# Patient Record
Sex: FEMALE | Race: WHITE | NOT HISPANIC OR LATINO | Employment: OTHER | URBAN - METROPOLITAN AREA
[De-identification: names, ages, dates, MRNs, and addresses within clinical notes are randomized per-mention and may not be internally consistent; named-entity substitution may affect disease eponyms.]

---

## 2021-01-17 ENCOUNTER — HOSPITAL ENCOUNTER (EMERGENCY)
Facility: CLINIC | Age: 46
Discharge: HOME | End: 2021-01-17
Attending: EMERGENCY MEDICINE | Admitting: EMERGENCY MEDICINE
Payer: COMMERCIAL

## 2021-01-17 ENCOUNTER — APPOINTMENT (OUTPATIENT)
Dept: MRI IMAGING | Facility: CLINIC | Age: 46
End: 2021-01-17
Attending: EMERGENCY MEDICINE
Payer: COMMERCIAL

## 2021-01-17 VITALS
SYSTOLIC BLOOD PRESSURE: 149 MMHG | TEMPERATURE: 98.8 F | OXYGEN SATURATION: 98 % | RESPIRATION RATE: 18 BRPM | DIASTOLIC BLOOD PRESSURE: 103 MMHG | HEART RATE: 96 BPM

## 2021-01-17 DIAGNOSIS — R42 VERTIGO: ICD-10-CM

## 2021-01-17 DIAGNOSIS — U07.1 2019 NOVEL CORONAVIRUS DISEASE (COVID-19): ICD-10-CM

## 2021-01-17 LAB
ANION GAP SERPL CALCULATED.3IONS-SCNC: 4 MMOL/L (ref 3–14)
BASOPHILS # BLD AUTO: 0 10E9/L (ref 0–0.2)
BASOPHILS NFR BLD AUTO: 0.4 %
BUN SERPL-MCNC: 12 MG/DL (ref 7–30)
CALCIUM SERPL-MCNC: 9.1 MG/DL (ref 8.5–10.1)
CHLORIDE SERPL-SCNC: 107 MMOL/L (ref 94–109)
CO2 SERPL-SCNC: 27 MMOL/L (ref 20–32)
CREAT SERPL-MCNC: 0.7 MG/DL (ref 0.52–1.04)
DIFFERENTIAL METHOD BLD: NORMAL
EOSINOPHIL # BLD AUTO: 0.1 10E9/L (ref 0–0.7)
EOSINOPHIL NFR BLD AUTO: 1.8 %
ERYTHROCYTE [DISTWIDTH] IN BLOOD BY AUTOMATED COUNT: 12.2 % (ref 10–15)
GFR SERPL CREATININE-BSD FRML MDRD: >90 ML/MIN/{1.73_M2}
GLUCOSE SERPL-MCNC: 81 MG/DL (ref 70–99)
HCT VFR BLD AUTO: 42.7 % (ref 35–47)
HGB BLD-MCNC: 14 G/DL (ref 11.7–15.7)
IMM GRANULOCYTES # BLD: 0 10E9/L (ref 0–0.4)
IMM GRANULOCYTES NFR BLD: 0.3 %
LYMPHOCYTES # BLD AUTO: 2.7 10E9/L (ref 0.8–5.3)
LYMPHOCYTES NFR BLD AUTO: 34.1 %
MCH RBC QN AUTO: 29.4 PG (ref 26.5–33)
MCHC RBC AUTO-ENTMCNC: 32.8 G/DL (ref 31.5–36.5)
MCV RBC AUTO: 90 FL (ref 78–100)
MONOCYTES # BLD AUTO: 0.6 10E9/L (ref 0–1.3)
MONOCYTES NFR BLD AUTO: 8.2 %
NEUTROPHILS # BLD AUTO: 4.3 10E9/L (ref 1.6–8.3)
NEUTROPHILS NFR BLD AUTO: 55.2 %
NRBC # BLD AUTO: 0 10*3/UL
NRBC BLD AUTO-RTO: 0 /100
PLATELET # BLD AUTO: 278 10E9/L (ref 150–450)
POTASSIUM SERPL-SCNC: 3.6 MMOL/L (ref 3.4–5.3)
RBC # BLD AUTO: 4.77 10E12/L (ref 3.8–5.2)
SODIUM SERPL-SCNC: 138 MMOL/L (ref 133–144)
TROPONIN I SERPL-MCNC: <0.015 UG/L (ref 0–0.04)
WBC # BLD AUTO: 7.8 10E9/L (ref 4–11)

## 2021-01-17 PROCEDURE — 93005 ELECTROCARDIOGRAM TRACING: CPT

## 2021-01-17 PROCEDURE — 85025 COMPLETE CBC W/AUTO DIFF WBC: CPT | Performed by: EMERGENCY MEDICINE

## 2021-01-17 PROCEDURE — 250N000013 HC RX MED GY IP 250 OP 250 PS 637: Performed by: EMERGENCY MEDICINE

## 2021-01-17 PROCEDURE — 80048 BASIC METABOLIC PNL TOTAL CA: CPT | Performed by: EMERGENCY MEDICINE

## 2021-01-17 PROCEDURE — 258N000003 HC RX IP 258 OP 636: Performed by: EMERGENCY MEDICINE

## 2021-01-17 PROCEDURE — 96360 HYDRATION IV INFUSION INIT: CPT | Mod: 59

## 2021-01-17 PROCEDURE — A9585 GADOBUTROL INJECTION: HCPCS | Performed by: EMERGENCY MEDICINE

## 2021-01-17 PROCEDURE — 84484 ASSAY OF TROPONIN QUANT: CPT | Performed by: EMERGENCY MEDICINE

## 2021-01-17 PROCEDURE — 70553 MRI BRAIN STEM W/O & W/DYE: CPT

## 2021-01-17 PROCEDURE — 255N000002 HC RX 255 OP 636: Performed by: EMERGENCY MEDICINE

## 2021-01-17 PROCEDURE — 99285 EMERGENCY DEPT VISIT HI MDM: CPT | Mod: 25

## 2021-01-17 RX ORDER — DIAZEPAM 5 MG
5 TABLET ORAL EVERY 6 HOURS PRN
Qty: 10 TABLET | Refills: 0 | Status: SHIPPED | OUTPATIENT
Start: 2021-01-17

## 2021-01-17 RX ORDER — DIAZEPAM 5 MG
5 TABLET ORAL ONCE
Status: COMPLETED | OUTPATIENT
Start: 2021-01-17 | End: 2021-01-17

## 2021-01-17 RX ORDER — GADOBUTROL 604.72 MG/ML
10 INJECTION INTRAVENOUS ONCE
Status: COMPLETED | OUTPATIENT
Start: 2021-01-17 | End: 2021-01-17

## 2021-01-17 RX ADMIN — SODIUM CHLORIDE 1000 ML: 9 INJECTION, SOLUTION INTRAVENOUS at 19:09

## 2021-01-17 RX ADMIN — DIAZEPAM 5 MG: 5 TABLET ORAL at 19:01

## 2021-01-17 RX ADMIN — GADOBUTROL 10 ML: 604.72 INJECTION INTRAVENOUS at 20:10

## 2021-01-17 ASSESSMENT — ENCOUNTER SYMPTOMS
WEAKNESS: 0
DIZZINESS: 1
SPEECH DIFFICULTY: 0
COUGH: 0
VOMITING: 0
FACIAL ASYMMETRY: 0

## 2021-01-17 NOTE — ED TRIAGE NOTES
"Patient arrives c/o vertigo for the last couple days with associated numbness/tingling/\"buzzing\" \"everywhere\". Pt is on day 10 of COVID and reports that some symptoms have improved but the dizziness, vertigo, and blurred vision that has been present for the last couple days is concerning. ABCs intact.   "

## 2021-01-18 LAB — INTERPRETATION ECG - MUSE: NORMAL

## 2021-01-18 NOTE — ED PROVIDER NOTES
"  History     Chief Complaint:  Dizziness     The history is provided by the patient.     Citlaly Burton is a healthy 45 year old female who presents for evaluation of dizziness. Citlaly tested positive for COVID-19 10 days ago with an at home COVID test. Her symptoms included headache, congestion, and loss of taste and smell. She had chest pain a few days ago and called her primary who prescribed her Zithromax. Her chest pain has since resolved. She did not have cough or dyspnea. However, over the last couple of days she has had numbness and tingling over her whole body which is described as \"buzzing\" like pins and needles. She feels dizzy as if the room is spinning and has a hard time seeing straight due to her blurry vision and \"crossed eyes\" without diplopia. She also has bilateral intermittent tinnitus. She was seen at Urgent Care for these symptoms and was directed here for further evaluation. She has been using Dramamine for dizziness without improvement, last dose 5 hours prior to interview. She denies fever, emesis, facial asymmetry, weakness, gait change, or speech difficulty.    Review of Systems   Constitutional: Negative for fever.   HENT: Positive for congestion and tinnitus (intermittent bilateral). Negative for ear pain.         Loss of taste and smell   Eyes: Positive for visual disturbance.        No diplopia   Respiratory: Negative for cough and shortness of breath.    Cardiovascular: Negative for chest pain (resolved).   Gastrointestinal: Negative for vomiting.   Musculoskeletal: Negative for gait problem.   Neurological: Positive for dizziness, numbness (entire body \"buzzing\") and headaches. Negative for facial asymmetry, speech difficulty and weakness.   All other systems reviewed and are negative.    Allergies:  No Known Drug Allergies    Medications:   Dramamine     Medical History:   She denies any past medical history.      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  Smoking status: negative   She presented alone today.     Physical Exam     Patient Vitals for the past 24 hrs:   BP Temp Temp src Pulse Resp SpO2   01/17/21 2035 (!) 149/103 -- -- -- -- --   01/17/21 1739 (!) 157/110 98.8  F (37.1  C) Oral 96 18 98 %      Physical Exam  General: Well-developed and well-nourished. Well appearing middle aged  woman. Cooperative.  Head:  Atraumatic.  Eyes:  Conjunctivae, lids, and sclerae are normal.  ENT:    Normal nose. Moist mucous membranes.  Neck:  Supple. Normal range of motion.  CV:  Regular rate and rhythm. Normal heart sounds with no murmurs, rubs, or gallops detected.  Resp:  No respiratory distress. Clear to auscultation bilaterally without decreased breath sounds, wheezing, rales, or rhonchi.  GI:  Soft. Non-distended. Non-tender.    MS:  Normal ROM. No bilateral lower extremity edema.  Skin:  Warm. Non-diaphoretic. No pallor.  Neuro: Awake. A&Ox3.     Strength 5/5 bilateral upper and lower extremities.    No pronator drift.    Sensation intact to light touch.    No facial droop. No dysarthria.    No aphasia.    PERRL.     No visual field deficits.    No dysmetria.    No dysdiadochokinesis.  Psych:  Normal mood and affect. Normal speech.  Vitals reviewed.    Emergency Department Course   EKG  Indication: Chest pain  Time: 1920  Rate 81 bpm. WI interval 192. QRS duration 100. QT/QTc 410/476.   Normal sinus rhythm  Normal ECG  No acute ST changes.  No prior EKG for comparison.    Imaging:  MR Brain w/o & w Contrast  1.  No evidence for acute infarction.    2.  No mass or mass effect.    3.  Normal ventricular caliber.    4.  No intracranial acute abnormalities identified.    5.  Mild membrane thickening is evident in the paranasal sinuses/ethmoid air cells.    6.  No abnormal enhancement.    7.  No evidence for intracranial hemorrhage/blood products.  Reading per radiology     Laboratory:  CBC: WBC 7.8, HGB 14.0,   BMP:  WNL (Creatinine  "0.70)  Troponin (Collected 1908): <0.015     Emergency Department Course:  Reviewed:  1828 I reviewed nursing notes, vitals, and past medical history.    Assessments:  2035 I updated and rechecked Citlaly. She passed a road test and is comfortable with discharge. Her dizziness is improved.    Interventions:  1901 Valium 5 mg PO   1909 0.9% NS 1000 mL IV    Disposition:  The patient was discharged home.   Impression & Plan   Covid-19  Citlaly Burton was evaluated during a global COVID-19 pandemic, which necessitated consideration that the patient might be at risk for infection with the SARS-CoV-2 virus that causes COVID-19.   Applicable protocols for evaluation were followed during the patient's care.   COVID-19 was considered as part of the patient's evaluation. The plan for testing is:  a test was obtained at a previously and reviewed & considered today.    Medical Decision Making:  Citlaly is a 45-year-old woman who has had 10 days of COVID-19 symptoms including loss of taste and smell and positive home COVID-19 test.  She tells me she has not had fever, shortness of breath, or cough but rather \"neurologic symptoms\".  She describes \"buzzing\" all over her body as well as vertigo.  She describes \"crossed eyes\" which she attempted to show me and I was unable to visualize but I believe she means blurred vision.  She was sent here from urgent care as they were concerned for CVA given new dizziness in the setting of COVID-19.  Her symptoms have not been improved with home Dramamine.  Fortunately, she appears quite well on exam and has no focal neurologic deficits.  To practice with an abundance of caution given known propensity for thrombosis in COVID-19, I did send her for MRI of the brain.  Fortunately, this reveals no infarct or other acute pathology aside from mild paranasal sinus membrane thickening which is likely related to her COVID-19.  EKG is reassuring without acute ST changes or arrhythmias and troponin is " undetectable.  I highly doubt a cardiac etiology for her dizziness.  Her basic laboratory studies are noncontributory.  She was given IV fluids and diazepam and did have improvement in her vertigo.  She was able to tolerate a trial of ambulation and is amenable with plan for discharge.  I recommended she rest and drink lots of fluids with Tylenol or ibuprofen should she develop fever.  I did provide her a small number of Valium as needed for persistent vertigo though she understands the need for close follow-up as well as the return precautions we discussed.  All of her questions were answered and she verbalized understanding.  Amenable to discharge.    Diagnosis:     ICD-10-CM    1. 2019 novel coronavirus disease (COVID-19)  U07.1    2. Vertigo  R42         Discharge Medications:  New Prescriptions    DIAZEPAM (VALIUM) 5 MG TABLET    Take 1 tablet (5 mg) by mouth every 6 hours as needed (Vertigo)       Scribe Disclosure:  I, Promise Munguia, am serving as a scribe at 6:28 PM on 1/17/2021 to document services personally performed by Rae Watts based on my observations and the provider's statements to me.     [facility] Rae Junior MD  01/19/21 [id number]

## 2021-01-18 NOTE — DISCHARGE INSTRUCTIONS
Make sure you are resting and drinking plenty of fluids.  Use Tylenol or ibuprofen as needed for pain or fever.  Valium as needed for vertigo.  This will make you drowsy do not drive afterwards.  Return with worsening of symptoms or new concerns.   Otherwise, you need strict isolation so as not to get others ill.  Follow up with your primary care provider via phone or virtual visit this week to ensure you are improving as expected.    You need to isolate for minimum of 10 days from when symptoms started AND 24 hours after fever resolves (without fever reducing medications) AND improvement of symptoms (whichever is longer)  You and any members of your household should limit activities in public for 14 days after starting home isolation.   Follow CDC recommendations for isolation, household cleaning, etc.

## 2021-01-19 ASSESSMENT — ENCOUNTER SYMPTOMS
FEVER: 0
SHORTNESS OF BREATH: 0
NUMBNESS: 1
HEADACHES: 1

## 2021-10-02 ENCOUNTER — HEALTH MAINTENANCE LETTER (OUTPATIENT)
Age: 46
End: 2021-10-02

## 2021-10-08 ENCOUNTER — HOSPITAL ENCOUNTER (EMERGENCY)
Facility: CLINIC | Age: 46
Discharge: HOME | End: 2021-10-08
Attending: PHYSICIAN ASSISTANT | Admitting: PHYSICIAN ASSISTANT
Payer: COMMERCIAL

## 2021-10-08 VITALS
HEART RATE: 84 BPM | OXYGEN SATURATION: 100 % | RESPIRATION RATE: 16 BRPM | DIASTOLIC BLOOD PRESSURE: 128 MMHG | SYSTOLIC BLOOD PRESSURE: 210 MMHG | TEMPERATURE: 97.7 F

## 2021-10-08 DIAGNOSIS — I10 ASYMPTOMATIC HYPERTENSION: ICD-10-CM

## 2021-10-08 DIAGNOSIS — G89.29 CHRONIC CHEST WALL PAIN: ICD-10-CM

## 2021-10-08 DIAGNOSIS — R07.89 CHRONIC CHEST WALL PAIN: ICD-10-CM

## 2021-10-08 LAB — TROPONIN I SERPL-MCNC: <0.015 UG/L (ref 0–0.04)

## 2021-10-08 PROCEDURE — 93005 ELECTROCARDIOGRAM TRACING: CPT

## 2021-10-08 PROCEDURE — 99284 EMERGENCY DEPT VISIT MOD MDM: CPT

## 2021-10-08 PROCEDURE — 36415 COLL VENOUS BLD VENIPUNCTURE: CPT | Performed by: PHYSICIAN ASSISTANT

## 2021-10-08 PROCEDURE — 84484 ASSAY OF TROPONIN QUANT: CPT | Performed by: PHYSICIAN ASSISTANT

## 2021-10-08 ASSESSMENT — ENCOUNTER SYMPTOMS
VOMITING: 0
DIZZINESS: 1
NAUSEA: 0
DIARRHEA: 0

## 2021-10-09 NOTE — ED PROVIDER NOTES
History   Chief Complaint:  No chief complaint on file.       The history is provided by the patient.      Citlaly Ni is a 46 year old female who presents with hypertension. Patient has been taking her blood pressure at home and it has been running around 160/110-118. 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 She called her doctor and was advised to be seen in the ED. She doesn't have history of elevated blood pressure in the past. She does complain of some intermittent dizziness. She notes muscle chest pain that is chronic for her and this does not feel any different. No loss of vision or double vision. No nausea, vomiting, or diarrhea. She will be able to get into her primary next week.     Review of Systems   Eyes: Negative for visual disturbance.   Cardiovascular: Positive for chest pain.        Elevated blood pressure   Gastrointestinal: Negative for diarrhea, nausea and vomiting.   Neurological: Positive for dizziness.   All other systems reviewed and are negative.    Allergies:  The patient has no known allergies.     Medications:  Valium    Past Medical History:     The patient denies past medical history.     Social History:  Presents to ED alone    Physical Exam     Patient Vitals for the past 24 hrs:   BP Temp Temp src Pulse Resp SpO2   10/08/21 2200 -- -- -- -- 16 --   10/08/21 2130 (!) 210/128 -- -- 84 -- --   10/08/21 2115 (!) 207/120 -- -- 80 -- --   10/08/21 2110 (!) 154/100 -- -- 80 -- --   10/08/21 2015 (!) 176/109 -- -- 82 -- --   10/08/21 1904 (!) 185/113 97.7  F (36.5  C) Temporal 93 16 100 %       Physical Exam  General: Alert and interactive. Appears well. Cooperative and pleasant.   Eyes: The pupils are equal and round. EOMs intact. No scleral icterus.  ENT: No abnormalities to the external nose or ears. Mucous membranes moist. Posterior oropharynx is non-erythematous.      Neck: Trachea is in the midline. No nuchal rigidity.     CV: Regular rate and rhythm. S1 and S2  normal without murmur, click, gallop or rub.   Resp: Breath sounds are clear bilaterally, without rhonchi, wheezes, rales. Non-labored, no retractions or accessory muscle use.     GI: Abdomen is soft without distension. No tenderness to palpation. No peritoneal signs.    MS: Moving all extremities well. Good muscle tone.   Skin: Warm and dry. No rash or lesions noted.  Neuro:  Speech is normal and fluent.   Face is symmetric without droop. CNs II-XII intact. Negative pronator drift. Finger to nose intact. Heel to shin intact.   Right Arm: Good  strength. 5/5 elbow flexion. 5/5 elbow extension. Sensation intact to light touch.   Left Arm: Good  strength. 5/5 elbow flexion. 5/5 elbow extension. Sensation intact to light touch.   Right Le/5 straight leg raise, 5/5 knee flexion, 5/5 knee extension, 5/5 dorsiflexion, 5/5 plantar flexion. Sensation intact to light touch.   Left Le/5 straight leg raise, 5/5 knee flexion, 5/5 knee extension, 5/5 dorsiflexion, 5/5 plantar flexion. Sensation intact to light touch.            Gait: Steady. No antalgia.   Psych: Awake. Alert.  Normal affect. Appropriate interactions.  Lymph: No anterior or posterior cervical lymphadenopathy noted.    Emergency Department Course   ECG  ECG obtained at , ECG read at   Normal sinus rhythm  Normal ECG   No significant change as compared to prior, dated 21.  Rate 85 bpm. ND interval 190 ms. QRS duration 96 ms. QT/QTc 380/452 ms. P-R-T axes 25 -6 31.     Laboratory:  Troponin (Collected ): <0.015    Emergency Department Course:  Reviewed:  I reviewed nursing notes, vitals, past medical history and Care Everywhere    Assessments:   I obtained history and examined the patient as noted above.    I rechecked the patient and explained findings.     Disposition:  The patient was discharged to home.     Impression & Plan     Medical Decision Making:  Citlaly Ni is a 46 year old female who presents for evaluation  of hypertension. 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  She called her primary doctor, who sent her here for further evaluation and treatment. The patient denies any symptoms other than intermittent dizziness which is which is ongoing for the past 6 months and not present now. She is a normal neurologic exam here with, with normal finger-nose-finger and heel shin rub.    The patient states that she has chronic pain across her chest which she describes as chest wall pain. Pain today feels no different than her normal pain. Her EKG shows no ischemic changes or signs of left ventricular hypertrophy.  Her troponin is negative, making demand ischemia less likely.    I had long discussion with the patient about management of asymptomatic hypertension. Most of the studies show that acute intervention from the emergency department can actually cause more harm than good. She has a close relationship with her primary care doctor and should be able to get in Monday or Tuesday. She had labile blood pressures here and had some in the 150 systolic, and I do not feel as though she needs to be started on anti-hypertensives from the emergency department.  I recommended that she take blood pressure readings over the course of the next few days until she is able to see primary care for follow-up. She should focus on diet and exercise.    There are no signs of endorgan damage today, but the patient was given strict return precautions for new concerning chest pain, weakness in her arms or legs, headaches, vision issues, abdominal pain, or other worrisome concerns.    Diagnosis:    ICD-10-CM    1. Asymptomatic hypertension  I10    2. Chronic chest wall pain  R07.89     G89.29        Scribe Disclosure:  Brandin GALLO, am serving as a scribe at 7:54 PM on 10/8/2021 to document services personally performed by Twyla Yoon PA-C  based on my observations and the provider's statements to me.            Twyla Yoon PA-C  10/08/21 6147

## 2021-10-11 LAB
ATRIAL RATE - MUSE: 85 BPM
DIASTOLIC BLOOD PRESSURE - MUSE: NORMAL MMHG
INTERPRETATION ECG - MUSE: NORMAL
P AXIS - MUSE: 25 DEGREES
PR INTERVAL - MUSE: 190 MS
QRS DURATION - MUSE: 96 MS
QT - MUSE: 380 MS
QTC - MUSE: 452 MS
R AXIS - MUSE: -6 DEGREES
SYSTOLIC BLOOD PRESSURE - MUSE: NORMAL MMHG
T AXIS - MUSE: 31 DEGREES
VENTRICULAR RATE- MUSE: 85 BPM

## 2023-01-14 ENCOUNTER — HEALTH MAINTENANCE LETTER (OUTPATIENT)
Age: 48
End: 2023-01-14

## 2023-11-06 ENCOUNTER — HOSPITAL ENCOUNTER (EMERGENCY)
Facility: CLINIC | Age: 48
Discharge: HOME | End: 2023-11-06
Attending: EMERGENCY MEDICINE | Admitting: EMERGENCY MEDICINE
Payer: COMMERCIAL

## 2023-11-06 ENCOUNTER — APPOINTMENT (OUTPATIENT)
Dept: CT IMAGING | Facility: CLINIC | Age: 48
End: 2023-11-06
Attending: EMERGENCY MEDICINE
Payer: COMMERCIAL

## 2023-11-06 VITALS
DIASTOLIC BLOOD PRESSURE: 84 MMHG | HEART RATE: 68 BPM | RESPIRATION RATE: 18 BRPM | SYSTOLIC BLOOD PRESSURE: 124 MMHG | OXYGEN SATURATION: 94 % | TEMPERATURE: 98.1 F

## 2023-11-06 DIAGNOSIS — N20.0 KIDNEY STONE: Primary | ICD-10-CM

## 2023-11-06 DIAGNOSIS — N13.2 HYDRONEPHROSIS WITH URINARY OBSTRUCTION DUE TO RENAL CALCULUS: ICD-10-CM

## 2023-11-06 LAB
ALBUMIN SERPL BCG-MCNC: 4.7 G/DL (ref 3.5–5.2)
ALBUMIN UR-MCNC: NEGATIVE MG/DL
ALP SERPL-CCNC: 69 U/L (ref 35–104)
ALT SERPL W P-5'-P-CCNC: 26 U/L (ref 0–50)
ANION GAP SERPL CALCULATED.3IONS-SCNC: 11 MMOL/L (ref 7–15)
APPEARANCE UR: CLEAR
AST SERPL W P-5'-P-CCNC: 17 U/L (ref 0–45)
BASOPHILS # BLD AUTO: 0.1 10E3/UL (ref 0–0.2)
BASOPHILS NFR BLD AUTO: 1 %
BILIRUB SERPL-MCNC: 0.4 MG/DL
BILIRUB UR QL STRIP: NEGATIVE
BUN SERPL-MCNC: 19.1 MG/DL (ref 6–20)
CALCIUM SERPL-MCNC: 10.1 MG/DL (ref 8.6–10)
CHLORIDE SERPL-SCNC: 101 MMOL/L (ref 98–107)
COLOR UR AUTO: ABNORMAL
CREAT SERPL-MCNC: 0.74 MG/DL (ref 0.51–0.95)
DEPRECATED HCO3 PLAS-SCNC: 24 MMOL/L (ref 22–29)
EGFRCR SERPLBLD CKD-EPI 2021: >90 ML/MIN/1.73M2
EOSINOPHIL # BLD AUTO: 0.1 10E3/UL (ref 0–0.7)
EOSINOPHIL NFR BLD AUTO: 1 %
ERYTHROCYTE [DISTWIDTH] IN BLOOD BY AUTOMATED COUNT: 12.8 % (ref 10–15)
GLUCOSE SERPL-MCNC: 132 MG/DL (ref 70–99)
GLUCOSE UR STRIP-MCNC: NEGATIVE MG/DL
HCT VFR BLD AUTO: 44.2 % (ref 35–47)
HGB BLD-MCNC: 14.5 G/DL (ref 11.7–15.7)
HGB UR QL STRIP: ABNORMAL
IMM GRANULOCYTES # BLD: 0 10E3/UL
IMM GRANULOCYTES NFR BLD: 0 %
KETONES UR STRIP-MCNC: NEGATIVE MG/DL
LEUKOCYTE ESTERASE UR QL STRIP: NEGATIVE
LIPASE SERPL-CCNC: 37 U/L (ref 13–60)
LYMPHOCYTES # BLD AUTO: 2.6 10E3/UL (ref 0.8–5.3)
LYMPHOCYTES NFR BLD AUTO: 33 %
MCH RBC QN AUTO: 30 PG (ref 26.5–33)
MCHC RBC AUTO-ENTMCNC: 32.8 G/DL (ref 31.5–36.5)
MCV RBC AUTO: 92 FL (ref 78–100)
MONOCYTES # BLD AUTO: 0.5 10E3/UL (ref 0–1.3)
MONOCYTES NFR BLD AUTO: 7 %
MUCOUS THREADS #/AREA URNS LPF: PRESENT /LPF
NEUTROPHILS # BLD AUTO: 4.7 10E3/UL (ref 1.6–8.3)
NEUTROPHILS NFR BLD AUTO: 58 %
NITRATE UR QL: NEGATIVE
NRBC # BLD AUTO: 0 10E3/UL
NRBC BLD AUTO-RTO: 0 /100
PH UR STRIP: 6.5 [PH] (ref 5–7)
PLATELET # BLD AUTO: 329 10E3/UL (ref 150–450)
POTASSIUM SERPL-SCNC: 3.8 MMOL/L (ref 3.4–5.3)
PROT SERPL-MCNC: 7.5 G/DL (ref 6.4–8.3)
RBC # BLD AUTO: 4.83 10E6/UL (ref 3.8–5.2)
RBC URINE: 74 /HPF
SODIUM SERPL-SCNC: 136 MMOL/L (ref 135–145)
SP GR UR STRIP: 1.01 (ref 1–1.03)
SQUAMOUS EPITHELIAL: 3 /HPF
UROBILINOGEN UR STRIP-MCNC: NORMAL MG/DL
WBC # BLD AUTO: 8 10E3/UL (ref 4–11)
WBC URINE: 4 /HPF

## 2023-11-06 PROCEDURE — 250N000011 HC RX IP 250 OP 636: Performed by: EMERGENCY MEDICINE

## 2023-11-06 PROCEDURE — 74177 CT ABD & PELVIS W/CONTRAST: CPT

## 2023-11-06 PROCEDURE — 96374 THER/PROPH/DIAG INJ IV PUSH: CPT | Mod: 59

## 2023-11-06 PROCEDURE — 83690 ASSAY OF LIPASE: CPT | Performed by: EMERGENCY MEDICINE

## 2023-11-06 PROCEDURE — 81001 URINALYSIS AUTO W/SCOPE: CPT | Performed by: EMERGENCY MEDICINE

## 2023-11-06 PROCEDURE — 85004 AUTOMATED DIFF WBC COUNT: CPT | Performed by: EMERGENCY MEDICINE

## 2023-11-06 PROCEDURE — 99285 EMERGENCY DEPT VISIT HI MDM: CPT | Mod: 25

## 2023-11-06 PROCEDURE — 36415 COLL VENOUS BLD VENIPUNCTURE: CPT | Performed by: EMERGENCY MEDICINE

## 2023-11-06 PROCEDURE — 96375 TX/PRO/DX INJ NEW DRUG ADDON: CPT

## 2023-11-06 PROCEDURE — 80053 COMPREHEN METABOLIC PANEL: CPT | Performed by: EMERGENCY MEDICINE

## 2023-11-06 PROCEDURE — 250N000009 HC RX 250: Performed by: EMERGENCY MEDICINE

## 2023-11-06 RX ORDER — HYDROMORPHONE HYDROCHLORIDE 1 MG/ML
0.5 INJECTION, SOLUTION INTRAMUSCULAR; INTRAVENOUS; SUBCUTANEOUS EVERY 30 MIN PRN
Status: DISCONTINUED | OUTPATIENT
Start: 2023-11-06 | End: 2023-11-06 | Stop reason: HOSPADM

## 2023-11-06 RX ORDER — IOPAMIDOL 755 MG/ML
500 INJECTION, SOLUTION INTRAVASCULAR ONCE
Status: COMPLETED | OUTPATIENT
Start: 2023-11-06 | End: 2023-11-06

## 2023-11-06 RX ORDER — OXYCODONE HYDROCHLORIDE 5 MG/1
5 TABLET ORAL EVERY 6 HOURS PRN
Qty: 12 TABLET | Refills: 0 | Status: SHIPPED | OUTPATIENT
Start: 2023-11-06 | End: 2023-11-09

## 2023-11-06 RX ORDER — ONDANSETRON 4 MG/1
4 TABLET, ORALLY DISINTEGRATING ORAL EVERY 8 HOURS PRN
Qty: 10 TABLET | Refills: 0 | Status: SHIPPED | OUTPATIENT
Start: 2023-11-06 | End: 2023-11-09

## 2023-11-06 RX ORDER — KETOROLAC TROMETHAMINE 15 MG/ML
15 INJECTION, SOLUTION INTRAMUSCULAR; INTRAVENOUS ONCE
Status: COMPLETED | OUTPATIENT
Start: 2023-11-06 | End: 2023-11-06

## 2023-11-06 RX ORDER — ONDANSETRON 2 MG/ML
4 INJECTION INTRAMUSCULAR; INTRAVENOUS EVERY 30 MIN PRN
Status: DISCONTINUED | OUTPATIENT
Start: 2023-11-06 | End: 2023-11-06 | Stop reason: HOSPADM

## 2023-11-06 RX ADMIN — KETOROLAC TROMETHAMINE 15 MG: 15 INJECTION, SOLUTION INTRAMUSCULAR; INTRAVENOUS at 04:45

## 2023-11-06 RX ADMIN — ONDANSETRON 4 MG: 2 INJECTION INTRAMUSCULAR; INTRAVENOUS at 03:27

## 2023-11-06 RX ADMIN — HYDROMORPHONE HYDROCHLORIDE 0.5 MG: 1 INJECTION, SOLUTION INTRAMUSCULAR; INTRAVENOUS; SUBCUTANEOUS at 03:28

## 2023-11-06 RX ADMIN — IOPAMIDOL 100 ML: 755 INJECTION, SOLUTION INTRAVENOUS at 03:56

## 2023-11-06 RX ADMIN — SODIUM CHLORIDE 65 ML: 9 INJECTION, SOLUTION INTRAVENOUS at 03:56

## 2023-11-06 ASSESSMENT — ACTIVITIES OF DAILY LIVING (ADL)
ADLS_ACUITY_SCORE: 35
ADLS_ACUITY_SCORE: 33

## 2023-11-06 NOTE — ED TRIAGE NOTES
Patient report sudden right sided abdominal and back pain that started around 0000.  She reports mild nausea.  ABCs intact, A&Ox4.     Triage Assessment (Adult)       Row Name 11/06/23 0249          Triage Assessment    Airway WDL WDL        Respiratory WDL    Respiratory WDL WDL        Skin Circulation/Temperature WDL    Skin Circulation/Temperature WDL WDL        Cardiac WDL    Cardiac WDL X  HTN        Peripheral/Neurovascular WDL    Peripheral Neurovascular WDL WDL        Cognitive/Neuro/Behavioral WDL    Cognitive/Neuro/Behavioral WDL WDL

## 2023-11-06 NOTE — DISCHARGE INSTRUCTIONS
Call urology to get seen as soon as you can - I did place urology consult order as well to hopefully get you into clinic sooner  Ibuprofen and tylenol as needed for pain  Oxycodone for more severe pain    Discharge Instructions  Kidney Stones    Kidney stones are a common problem that can cause a lot of pain but fortunately are usually not dangerous. Kidney stones form in the kidney and then can cause a blockage (obstruction) of the flow of urine from the kidney which leads to pain. Most patients can manage kidney stones at home (without a hospital stay).  However, sometimes your condition may be worse than it seemed at first, or may get worse with time. Most kidney stones will pass on their own, but occasionally stones may need to be removed by an urologist.    Generally, every Emergency Department visit should have a follow-up clinic visit with either a primary or a specialty clinic/provider. Please follow-up as instructed by your emergency provider today.      Return to the Emergency Department if:  Your pain is not controlled despite the medications provided or recommended.  You are vomiting (throwing up) and cannot keep fluids or medications down.  You develop a fever (>100.4 F).  You feel much more ill or develop new symptoms.  What can I do to help myself?  Be sure to drink plenty of fluids.  If instructed to do so, strain your urine (pee) with the urine strainer you were provided with today. Your stone may look like a grain of sand or a small pebble. Collect any stones in the cup provided and bring to your follow-up appointment.  Staying active is good, and may help the stone to pass. You may do whatever you feel up to doing without restrictions.   Treatment:  Non-steroidal anti-inflammatory drugs (NSAIDs). This includes prescription medicines like Toradol  (ketorolac) and non-prescription medicines like Advil  (ibuprofen) and Nuprin  (ibuprofen) and Naproxen. These pain relievers are very effective for  kidney stones.  Nausea (sick to your stomach) medication.  Nausea and vomiting are common with kidney stones, so your provider may send you home with medicine for this.   Flomax  (tamsulosin). This medicine is sometimes used for men with prostate problems, but also can help kidney stones to pass. Its effectiveness is controversial or questionable so it is prescribed in certain situations. This medicine can lower blood pressure, and you may feel faint/lightheaded, especially when you first stand up. Be sure to get up gradually, sit down if you feel faint, and avoid activity where feeling faint would be dangerous, such as climbing ladders.  If you were given a prescription for medicine here today, be sure to read all of the information (including the package insert) that comes with your prescription.  This will include important information about the medicine, its side effects, and any warnings that you need to know about.  The pharmacist who fills the prescription can provide more information and answer questions you may have about the medicine.  If you have questions or concerns that the pharmacist cannot address, please call or return to the Emergency Department.   Remember that you can always come back to the Emergency Department if you are not able to see your regular provider in the amount of time listed above, if you get any new symptoms, or if there is anything that worries you.    Opioid Medication Information    You have been given a prescription for an opioid (narcotic) pain medicine and/or have received a pain medicine while here in the Emergency Department. These medicines can make you drowsy or impaired. You must not drive, operate dangerous equipment, or engage in any other dangerous activities while taking these medications. If you drive while taking these medications, you could be arrested for driving under the influence (DUI). Do not drink any alcohol while you are taking these medications.      Opioid pain medications can cause addiction. If you have a history of chemical dependency of any type, you are at a higher risk of becoming addicted to pain medications.  Only take these prescribed medications to treat your pain when all other options have been tried. Take it for as short a time and as few doses as possible. Store your pain pills in a secure place, as they are frequently stolen and provide a dangerous opportunity for children or visitors in your house to start abusing these powerful medications. We will not replace any lost or stolen medicine.    If you do not finish your medication, it is a good idea to get rid of it but please do not flush it down the toilet. Please dispose of the remaining medication at a local pharmacy or law enforcement facility. The [place] [employer] has additional information on medication disposal: https://www.pca.[email or web address].[email or web address]/living-green/managing-unwanted-medications.      Many prescription pain medications contain Tylenol  (acetaminophen), including Vicodin , Tylenol #3 , Norco , Lortab , and Percocet .  You should not take any extra pills of Tylenol  if you are using these prescription medications or you can get very sick.  Do not ever take more than 3000 mg of acetaminophen in any 24 hour period.    All opioids tend to cause constipation. Drink plenty of water and eat foods that have a lot of fiber, such as fruits, vegetables, prune juice, apple juice and high fiber cereal.  Take a laxative if you don t move your bowels at least every other day. Miralax , Milk of Magnesia, Colace , or Senna  can be used to keep you regular.

## 2023-11-06 NOTE — ED PROVIDER NOTES
History     Chief Complaint:  Abdominal Pain    The history is provided by the patient.     Citlaly Ni is a 48 year old female presenting for evaluation of abdominal pain. Citlaly describes sudden onset of right lower back pain radiating to her RLQ around 2330 tonight. She notes nausea and urinary frequency. She denies vomiting and abnormal bowel movements. She reports a history of kidney stones which she passed on her own, the most recent occurrence six months ago. She reports a history of abdominoplasty. She notes use of ibuprofen for pain without relief. Citlaly reports a CT workup a few weeks ago that found kidney calcification.    Independent Historian:   None - Patient Only    Review of External Notes:   Reviewed CT abdomen from October 2023 at OSH in [facility]    Medications:    Valium  Prilosec    Past Medical History:    The patient has no known pertinent past medical history.    Physical Exam   Patient Vitals for the past 24 hrs:   BP Temp Temp src Pulse Resp SpO2   11/06/23 0500 130/87 -- -- 68 -- 91 %   11/06/23 0248 (!) 170/108 98.1  F (36.7  C) Oral 79 18 98 %      Physical Exam  General: Sitting up in chair  Eyes:  The pupils are equal and round    Conjunctivae and sclerae are normal  ENT:    Atraumatic face  Neck:  Normal range of motion  CV:  Regular rate     Skin warm and well perfused   Resp:  Non labored breathing on room air    No tachypnea    No cough heard  GI:  Abdomen is soft, there is no rigidity    No distension    No rebound tenderness     No abdominal tenderness    No CVA tenderness  MS:  Normal muscular tone  Skin:  No rash or acute skin lesions noted  Neuro:   Awake, alert.      Speech is normal and fluent.    Face is symmetric.     Moves all extremities equally  Psych: Normal affect.  Appropriate interactions.    Emergency Department Course   Imaging:  CT Abdomen Pelvis w Contrast   Final Result   Addendum (preliminary) 1 of 1   Addendum:      This addendum is made to clarify  patient's right renal anatomy. 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 The dilated collecting system/pelvis is that serving the lower    pole of the right kidney. The large obstructing stone is also in the lower    pole of the right kidney. The right upper renal collecting system is    decompressed, with a 1 mm    nonobstructing stone present.      This clarification was made in consultation with Dr. Salgado at 4:42 AM on    11/06/2023.      Final   IMPRESSION:       1.  Mild right hydronephrosis without associated hydroureter suggests right UPJ obstruction.      2.  1.3 cm chronic obstructing stone in the right lower pole kidney with overlying renal cortical scarring. A couple 2 to 3 mm cysts are also present in the mildly distended right renal pelvis.      3.  Colonic diverticulosis without acute diverticulitis. Appendix is normal.         Laboratory:  Labs Ordered and Resulted from Time of ED Arrival to Time of ED Departure   COMPREHENSIVE METABOLIC PANEL - Abnormal       Result Value    Sodium 136      Potassium 3.8      Carbon Dioxide (CO2) 24      Anion Gap 11      Urea Nitrogen 19.1      Creatinine 0.74      GFR Estimate >90      Calcium 10.1 (*)     Chloride 101      Glucose 132 (*)     Alkaline Phosphatase 69      AST 17      ALT 26      Protein Total 7.5      Albumin 4.7      Bilirubin Total 0.4     ROUTINE UA WITH MICROSCOPIC REFLEX TO CULTURE - Abnormal    Color Urine Light Yellow      Appearance Urine Clear      Glucose Urine Negative      Bilirubin Urine Negative      Ketones Urine Negative      Specific Gravity Urine 1.015      Blood Urine Moderate (*)     pH Urine 6.5      Protein Albumin Urine Negative      Urobilinogen Urine Normal      Nitrite Urine Negative      Leukocyte Esterase Urine Negative      Mucus Urine Present (*)     RBC  Urine 74 (*)     WBC Urine 4      Squamous Epithelials Urine 3 (*)    LIPASE - Normal    Lipase 37     CBC WITH PLATELETS AND DIFFERENTIAL    WBC Count 8.0      RBC Count 4.83      Hemoglobin 14.5      Hematocrit 44.2      MCV 92      MCH 30.0      MCHC 32.8      RDW 12.8      Platelet Count 329      % Neutrophils 58      % Lymphocytes 33      % Monocytes 7      % Eosinophils 1      % Basophils 1      % Immature Granulocytes 0      NRBCs per 100 WBC 0      Absolute Neutrophils 4.7      Absolute Lymphocytes 2.6      Absolute Monocytes 0.5      Absolute Eosinophils 0.1      Absolute Basophils 0.1      Absolute Immature Granulocytes 0.0      Absolute NRBCs 0.0       Emergency Department Course & Assessments:       Interventions:  Medications   ondansetron (ZOFRAN) injection 4 mg (4 mg Intravenous $Given 11/6/23 0327)   HYDROmorphone (PF) (DILAUDID) injection 0.5 mg (0.5 mg Intravenous $Given 11/6/23 0328)   iopamidol (ISOVUE-370) solution 500 mL (100 mLs Intravenous $Given 11/6/23 0356)   Sodium Chloride for CT Scan Flush Use (65 mLs Intravenous $Given 11/6/23 0356)   ketorolac (TORADOL) injection 15 mg (15 mg Intravenous $Given 11/6/23 0445)     Assessments:  0316 I obtained history and examined the patient as noted above.   0600 I rechecked the patient. She reports no pain. I discussed findings and discharge with the patient. All questions answered.     Independent Interpretation (X-rays, CTs, rhythm strip):  None    Consultations/Discussion of Management or Tests:  ED Course as of 11/06/23 0632   Mon Nov 06, 2023   0442 I spoke with radiology regarding the patient's imaging results.    0448 I spoke with Dr. Dickinson, urology, regarding the patient's history and presentation in the emergency department today.      Disposition:  The patient was discharged to home.    Impression & Plan    Medical Decision Making:  Citlaly Ni is a 48 year old female who presented to the ED with abdominal pain. Pain in lower back and  wraps to front in RLQ. Hx of stones but appendicitis on differential as well. No UTI but does have RBC on UA. Creatinine normal. CT abdomen with no appendicitis. Does have large chronic stone, two duplicating systems with UPJ obstruction seen. Unclear if UPJ obstruction is more acute versus chronic given her history of two duplicating systems. Her symptoms today most consistent with stone, colic. Pain under control in ED. Discussed with urology CT findings, recommend outpatient follow-up. If symptoms controlled, no indication for hospitalization. Discussed with patient, oxycodone and zofran for home. Referral placed for urology but also recommended that she call clinic.     Diagnosis:    ICD-10-CM    1. Kidney stone  N20.0 Adult Urology  Referral     CANCELED: Adult Urology  Referral      2. Hydronephrosis with urinary obstruction due to renal calculus  N13.2         Discharge Medications:  New Prescriptions    ONDANSETRON (ZOFRAN ODT) 4 MG ODT TAB    Take 1 tablet (4 mg) by mouth every 8 hours as needed for nausea    OXYCODONE (ROXICODONE) 5 MG TABLET    Take 1 tablet (5 mg) by mouth every 6 hours as needed for pain      Scribe Disclosure:  I, Fantasma Salazar, am serving as a scribe at 3:12 AM on 11/6/2023 to document services personally performed by Amanda Salgado MD based on my observations and the provider's statements to me.   11/6/2023   Amanda Salgado MD Goertz, Maria Kristine, MD  11/06/23 2416

## 2023-12-12 ENCOUNTER — VIRTUAL VISIT (OUTPATIENT)
Dept: UROLOGY | Facility: CLINIC | Age: 48
End: 2023-12-12

## 2023-12-12 VITALS — HEIGHT: 68 IN

## 2023-12-12 DIAGNOSIS — N20.0 KIDNEY STONE: ICD-10-CM

## 2023-12-12 RX ORDER — BUPROPION HYDROCHLORIDE 150 MG/1
150 TABLET ORAL EVERY MORNING
COMMUNITY
Start: 2023-09-30

## 2023-12-12 RX ORDER — AMLODIPINE BESYLATE 5 MG/1
TABLET ORAL
COMMUNITY
Start: 2023-12-06

## 2023-12-12 ASSESSMENT — PAIN SCALES - GENERAL: PAINLEVEL: NO PAIN (0)

## 2023-12-12 NOTE — NURSING NOTE
Is the patient currently in the state of [place]? YES    Visit mode:VIDEO    If the visit is dropped, the patient can be reconnected by: VIDEO VISIT: Send to e-mail at: [email or web address]    Will anyone else be joining the visit? NO  (If patient encounters technical issues they should call [phone])    How would you like to obtain your AVS? MyChart    Are changes needed to the allergy or medication list? No    Reason for visit: Consult    Shalonda CISNEROS

## 2023-12-12 NOTE — LETTER
12/12/2023       RE: Citlaly Ni  [address] [address] [address]     Dear Colleague,    Thank you for referring your patient, Citlaly Ni, to the [facility] [place] at [facility]. Please see a copy of my visit note below.    Disregard.    Again, thank you for allowing me to participate in the care of your patient.      Sincerely,    JAGDISH Garcia CNP

## 2023-12-12 NOTE — PROGRESS NOTES
"Virtual Visit Details    Type of service:  Video Visit     Originating Location (pt. Location): {video visit patient location:029630::\"Home\"}  {PROVIDER LOCATION On-site should be selected for visits conducted from your clinic location or adjoining [facility] hospital, academic office, or other nearby [facility] building. Off-site should be selected for all other provider locations, including home:192222}  Distant Location (provider location):  {virtual location provider:433859}  Platform used for Video Visit: {Virtual Visit Platforms:021396::\"WUT\"}  "

## 2023-12-13 ENCOUNTER — TELEPHONE (OUTPATIENT)
Dept: UROLOGY | Facility: CLINIC | Age: 48
End: 2023-12-13

## 2023-12-13 NOTE — TELEPHONE ENCOUNTER
----- Message from JAGDISH Garcia CNP sent at 12/12/2023  8:26 AM CST -----  Spoke with Dr. Mccain about this patient. Please reschedule this patient with Dr. Mccain.

## 2024-02-11 ENCOUNTER — HEALTH MAINTENANCE LETTER (OUTPATIENT)
Age: 49
End: 2024-02-11

## 2024-02-29 ENCOUNTER — VIRTUAL VISIT (OUTPATIENT)
Dept: UROLOGY | Facility: CLINIC | Age: 49
End: 2024-02-29
Payer: COMMERCIAL

## 2024-02-29 DIAGNOSIS — N20.0 KIDNEY STONE: Primary | ICD-10-CM

## 2024-02-29 PROCEDURE — 99203 OFFICE O/P NEW LOW 30 MIN: CPT | Mod: 95 | Performed by: UROLOGY

## 2024-02-29 NOTE — LETTER
2/29/2024       RE: Citlaly Ni  [address] [address] [address]     Dear Colleague,    Thank you for referring your patient, Citlaly Ni, to the [facility] [place] at [facility]. Please see a copy of my visit note below.    [facility]  Main Office: [address]  [address]  [place] [address]       CHIEF COMPLAINT:   Right kidney stones    HISTORY:   This is a 48-year-old woman with no prior stone history who was in the emergency department in November with right flank pain.  She had a CT scan performed that showed right kidney stones and hydronephrosis.  She actually had the stones diagnosed about a month previous to that in [facility].  She says that she passed 2 small stones a few days later and she has had no pain or problems since that time.  She currently feels well.      PAST MEDICAL HISTORY:   No past medical history on file.    PAST SURGICAL HISTORY:   No past surgical history on file.    FAMILY HISTORY:   No family history on file.    SOCIAL HISTORY:   Social History     Tobacco Use    Smoking status: Former     Packs/day: 0.25     Years: 1.00     Additional pack years: 0.00     Total pack years: 0.25     Types: Cigarettes    Smokeless tobacco: Never   Substance Use Topics    Alcohol use: Not on file        ALLERGIES:  No Known Allergies    MEDICATIONS:     Current Outpatient Medications:     amLODIPine (NORVASC) 5 MG tablet, , Disp: , Rfl:     buPROPion (WELLBUTRIN XL) 150 MG 24 hr tablet, Take 150 mg by mouth every morning, Disp: , Rfl:     diazepam (VALIUM) 5 MG tablet, Take 1 tablet (5 mg) by mouth every 6 hours as needed (Vertigo), Disp: 10 tablet, Rfl: 0    REVIEW OF SYSTEMS:  Allergic/Immunologic: negative  Constitutional Symptoms: negative   Fever: none   Weight loss: none   Other: none  Hematologic/Lymphatic: negative  Integumentary: negative   Breast: negative   Hair: negative   Skin:  negative   Skin: negative  Eyes: negative  Ears/Nose/Throat: negative  Respiratory: negative  Cardiovascular: negative  Gastrointestinal: negative  Genitourinary: negative  Musculoskeletal: negative  Neurologic: negative  Psychiatric: negative  Reproductive System: negative  Endocrine: negative      PHYSICAL EXAM:    General: Alert and oriented to time, place, and self. In NAD   HEENT: Head AT/NC, EOMI, CN Grossly intact   Lungs: no respiratory distress, or pursed lip breathing   Heart: No obvious jugular venous distension present   Musculoskeltal: Normal movements. Normal appearing musculature  Skin: no suspicious lesions or rashes   Neuro: Alert, oriented, speech and mentation normal; moving all 4 extremities equally.   Psych: affect and mood normal      Laboratory Studies:     Imaging Studies: I personally reviewed her CT scan images from November.  At that time she had hydronephrosis and bladder 2 small stones in the right renal pelvis.  She has another larger calcification in the periphery of the right kidney that looks more likely to be a parenchymal stone or stone in a calyceal diverticulum.      CLINICAL IMPRESSION:   Right kidney stones    PLAN:   We discussed her CT scan results.  I had initially thought that perhaps she had a UPJ obstruction as her hydronephrosis appeared out of proportion to the location of her stones.  However, she has passed 2 stones since that time (she caught them in the strainer) so UPJ obstruction would seem less likely here.  The stone in the periphery of her right kidney is likely not clinically significant and not likely contiguous with the collecting system, so I recommended observation.    I recommended that in May (6 months after previous CT scan) we repeat a CT scan to make certain no new stones are forming.  We will get this arranged for her.      Mat Mccain M.D.      Virtual Visit Details    Type of service:  Video Visit     Originating Location (pt. Location):  Home    Distant Location (provider location):  On-site  Platform used for Video Visit: Janeen

## 2024-02-29 NOTE — NURSING NOTE
Is the patient currently in the state of [place]? YES    Visit mode:VIDEO    If the visit is dropped, the patient can be reconnected by: VIDEO VISIT: Text to cell phone:   Telephone Information:   Mobile [phone]       Will anyone else be joining the visit? NO  (If patient encounters technical issues they should call [phone])    How would you like to obtain your AVS? MyChart    Are changes needed to the allergy or medication list? No    Reason for visit: Consult    Nohelia CISNEROS

## 2024-06-04 ENCOUNTER — VIRTUAL VISIT (OUTPATIENT)
Dept: UROLOGY | Facility: CLINIC | Age: 49
End: 2024-06-04
Payer: COMMERCIAL

## 2024-06-04 DIAGNOSIS — N20.0 NEPHROLITHIASIS: Primary | ICD-10-CM

## 2024-06-04 PROCEDURE — 99213 OFFICE O/P EST LOW 20 MIN: CPT | Mod: 95 | Performed by: UROLOGY

## 2024-06-04 NOTE — PROGRESS NOTES
Office Visit Note  [facility]  [phone]    Jun 4, 2024    [unfilled]    1975    UROLOGIC DIAGNOSES:    Right kidney stones with hydronephrosis  Embedded right kidney stone    CURRENT INTERVENTIONS:        History:    Citlaly was to be set up for a CT scan at this time to monitor her kidney stones but she requested video visit before scheduling CT scan.  She has felt completely well with no urinary symptoms or complaints.      Imaging:      Labs:      MEDICATIONS:    Current Outpatient Medications:     amLODIPine (NORVASC) 5 MG tablet, , Disp: , Rfl:     buPROPion (WELLBUTRIN XL) 150 MG 24 hr tablet, Take 150 mg by mouth every morning, Disp: , Rfl:     diazepam (VALIUM) 5 MG tablet, Take 1 tablet (5 mg) by mouth every 6 hours as needed (Vertigo), Disp: 10 tablet, Rfl: 0    ALLERGIES:   No Known Allergies    REVIEW OF SYSTEMS:   Skin: No rash, pruritis, or skin pigmentation  Eyes: No changes in vision  Ears/Nose/Throat: No changes in hearing, no nosebleeds  Respiratory: No shortness of breath, dyspnea on exertion, cough, or hemoptysis  Cardiovascular: No chest pain or palpitations  Gastrointestinal: No diarrhea or constipation. No abdominal pain. No hematochezia  Genitourinary: see HPI  Musculoskeletal: No pain or swelling of joints, normal range of motion  Neurologic: No weakness or tremors  Psychiatric: No recent changes in memory or mood  Hematologic/Lymphatic/Immunologic: No easy bruising or enlarged lymph nodes  Endocrine: No weight gain or loss    SURGICAL HISTORY:  No past surgical history on file.      PHYSICAL EXAM:    General: Alert and oriented to time, place, and self. In NAD   HEENT: Head AT/NC, EOMI, CN Grossly intact   Lungs: no respiratory distress, or pursed lip breathing   Heart: No obvious jugular venous distension present   Musculoskeltal: Normal movements. Normal appearing musculature  Skin: no suspicious lesions or rashes   Neuro: Alert, oriented, speech and mentation  normal; moving all 4 extremities equally.   Psych: affect and mood normal        Urinalysis:  UA RESULTS:  Recent Labs   Lab Test 11/06/23  0339   COLOR Light Yellow   APPEARANCE Clear   URINEGLC Negative   URINEBILI Negative   URINEKETONE Negative   SG 1.015   UBLD Moderate*   URINEPH 6.5   PROTEIN Negative   NITRITE Negative   LEUKEST Negative   RBCU 74*   WBCU 4         IMPRESSION:    Right kidney stones    PLAN:    We again discussed her right kidney stones.  The radiologist also noted a concern for potential UPJ obstruction.  I counseled her that if her pain is gone that does not necessarily mean that her stones have passed, the stones could be present in her right kidney and just nonobstructing at this time.  I recommended a noncontrast CT scan be repeated.  She agreed to the plan.  CT scan was ordered for her and I will call her when the results are available.      Mat Mccain M.D.

## 2024-06-04 NOTE — LETTER
6/4/2024       RE: Citlaly Ni  [address] [address] [address]     Dear Colleague,    Thank you for referring your patient, Citlaly Ni, to the [facility] [place] at [facility]. Please see a copy of my visit note below.    Office Visit Note  [facility]  [phone]    Jun 4, 2024    [unfilled]    1975    UROLOGIC DIAGNOSES:    Right kidney stones with hydronephrosis  Embedded right kidney stone    CURRENT INTERVENTIONS:        History:    Citlaly was to be set up for a CT scan at this time to monitor her kidney stones but she requested video visit before scheduling CT scan.  She has felt completely well with no urinary symptoms or complaints.      Imaging:      Labs:      MEDICATIONS:    Current Outpatient Medications:     amLODIPine (NORVASC) 5 MG tablet, , Disp: , Rfl:     buPROPion (WELLBUTRIN XL) 150 MG 24 hr tablet, Take 150 mg by mouth every morning, Disp: , Rfl:     diazepam (VALIUM) 5 MG tablet, Take 1 tablet (5 mg) by mouth every 6 hours as needed (Vertigo), Disp: 10 tablet, Rfl: 0    ALLERGIES:   No Known Allergies    REVIEW OF SYSTEMS:   Skin: No rash, pruritis, or skin pigmentation  Eyes: No changes in vision  Ears/Nose/Throat: No changes in hearing, no nosebleeds  Respiratory: No shortness of breath, dyspnea on exertion, cough, or hemoptysis  Cardiovascular: No chest pain or palpitations  Gastrointestinal: No diarrhea or constipation. No abdominal pain. No hematochezia  Genitourinary: see HPI  Musculoskeletal: No pain or swelling of joints, normal range of motion  Neurologic: No weakness or tremors  Psychiatric: No recent changes in memory or mood  Hematologic/Lymphatic/Immunologic: No easy bruising or enlarged lymph nodes  Endocrine: No weight gain or loss    SURGICAL HISTORY:  No past surgical history on file.      PHYSICAL EXAM:    General: Alert and oriented to time, place, and self. In NAD   HEENT: Head  AT/NC, EOMI, CN Grossly intact   Lungs: no respiratory distress, or pursed lip breathing   Heart: No obvious jugular venous distension present   Musculoskeltal: Normal movements. Normal appearing musculature  Skin: no suspicious lesions or rashes   Neuro: Alert, oriented, speech and mentation normal; moving all 4 extremities equally.   Psych: affect and mood normal        Urinalysis:  UA RESULTS:  Recent Labs   Lab Test 11/06/23  0339   COLOR Light Yellow   APPEARANCE Clear   URINEGLC Negative   URINEBILI Negative   URINEKETONE Negative   SG 1.015   UBLD Moderate*   URINEPH 6.5   PROTEIN Negative   NITRITE Negative   LEUKEST Negative   RBCU 74*   WBCU 4         IMPRESSION:    Right kidney stones    PLAN:    We again discussed her right kidney stones.  The radiologist also noted a concern for potential UPJ obstruction.  I counseled her that if her pain is gone that does not necessarily mean that her stones have passed, the stones could be present in her right kidney and just nonobstructing at this time.  I recommended a noncontrast CT scan be repeated.  She agreed to the plan.  CT scan was ordered for her and I will call her when the results are available.      Mat Mccain M.D.

## 2024-06-04 NOTE — NURSING NOTE
Is the patient currently in the state of [place]? YES    Visit mode:VIDEO    If the visit is dropped, the patient can be reconnected by: VIDEO VISIT: Text to cell phone:   Telephone Information:   Mobile [phone]       Will anyone else be joining the visit? NO  (If patient encounters technical issues they should call [phone])    How would you like to obtain your AVS? MyChart    Are changes needed to the allergy or medication list? No and Patient started 0.25ml of Ozempic 06/03/2024    Are refills needed on medications prescribed by this physician? NO    Reason for visit: RECHECK    Latisha CISNEROS

## 2024-06-20 ENCOUNTER — HOSPITAL ENCOUNTER (OUTPATIENT)
Dept: CT IMAGING | Facility: CLINIC | Age: 49
Discharge: HOME | End: 2024-06-20
Attending: UROLOGY | Admitting: UROLOGY
Payer: COMMERCIAL

## 2024-06-20 DIAGNOSIS — N20.0 NEPHROLITHIASIS: ICD-10-CM

## 2024-06-20 PROCEDURE — 74176 CT ABD & PELVIS W/O CONTRAST: CPT

## 2025-03-08 ENCOUNTER — HEALTH MAINTENANCE LETTER (OUTPATIENT)
Age: 50
End: 2025-03-08